# Patient Record
Sex: FEMALE | Race: WHITE | URBAN - METROPOLITAN AREA
[De-identification: names, ages, dates, MRNs, and addresses within clinical notes are randomized per-mention and may not be internally consistent; named-entity substitution may affect disease eponyms.]

---

## 2022-09-02 PROBLEM — 428283002 HISTORY OF POLYP OF COLON: Status: ACTIVE | Noted: 2022-09-02

## 2022-09-08 ENCOUNTER — OFFICE VISIT (OUTPATIENT)
Dept: URBAN - METROPOLITAN AREA CLINIC 12 | Facility: CLINIC | Age: 47
End: 2022-09-08
Payer: COMMERCIAL

## 2022-09-08 ENCOUNTER — WEB ENCOUNTER (OUTPATIENT)
Dept: URBAN - METROPOLITAN AREA CLINIC 12 | Facility: CLINIC | Age: 47
End: 2022-09-08

## 2022-09-08 VITALS
DIASTOLIC BLOOD PRESSURE: 83 MMHG | WEIGHT: 164.6 LBS | BODY MASS INDEX: 26.45 KG/M2 | HEART RATE: 73 BPM | TEMPERATURE: 97.5 F | HEIGHT: 66 IN | SYSTOLIC BLOOD PRESSURE: 118 MMHG

## 2022-09-08 DIAGNOSIS — R79.89 ABNORMAL LFTS: ICD-10-CM

## 2022-09-08 DIAGNOSIS — R19.7 DIARRHEA: ICD-10-CM

## 2022-09-08 PROCEDURE — 99204 OFFICE O/P NEW MOD 45 MIN: CPT | Performed by: INTERNAL MEDICINE

## 2022-09-08 RX ORDER — AZILSARTAN KAMEDOXOMIL 40 MG/1
1 TABLET TABLET ORAL
Status: ACTIVE | COMMUNITY

## 2022-09-08 NOTE — HPI-TODAY'S VISIT:
46-year-old female presented today for evaluation of diarrhea.  Symptoms started 4 weeks ago she started having 5-6 bowel movement a day mostly in the morning associated with the bloating.  No blood in the stool but she had mild abdominal cramping associated with the diarrhea.  No nausea no vomiting.  She was seen by her primary care 3 days ago she had a stool studies and lab both were still pending at the time of this evaluation.  Patient is scheduled for screening colonoscopy with Dr. Cancino in September 21, 2022.  She no recent antibiotic use or travel she had previous lab about 2 weeks ago showed mildly elevated AST 48 ALT 60 had repeated lab 3 days ago still pending She never had colonoscopy  before no family history of colon cancer.  No recent travel or antibiotic use no NSAID use

## 2022-09-10 ENCOUNTER — TELEPHONE ENCOUNTER (OUTPATIENT)
Dept: URBAN - METROPOLITAN AREA CLINIC 23 | Facility: CLINIC | Age: 47
End: 2022-09-10

## 2022-09-12 ENCOUNTER — TELEPHONE ENCOUNTER (OUTPATIENT)
Dept: URBAN - METROPOLITAN AREA CLINIC 23 | Facility: CLINIC | Age: 47
End: 2022-09-12

## 2022-09-21 ENCOUNTER — CLAIMS CREATED FROM THE CLAIM WINDOW (OUTPATIENT)
Dept: URBAN - METROPOLITAN AREA CLINIC 4 | Facility: CLINIC | Age: 47
End: 2022-09-21
Payer: COMMERCIAL

## 2022-09-21 ENCOUNTER — OFFICE VISIT (OUTPATIENT)
Dept: URBAN - METROPOLITAN AREA SURGERY CENTER 15 | Facility: SURGERY CENTER | Age: 47
End: 2022-09-21

## 2022-09-21 ENCOUNTER — TELEPHONE ENCOUNTER (OUTPATIENT)
Dept: URBAN - METROPOLITAN AREA CLINIC 78 | Facility: CLINIC | Age: 47
End: 2022-09-21

## 2022-09-21 ENCOUNTER — CLAIMS CREATED FROM THE CLAIM WINDOW (OUTPATIENT)
Dept: URBAN - METROPOLITAN AREA SURGERY CENTER 15 | Facility: SURGERY CENTER | Age: 47
End: 2022-09-21
Payer: COMMERCIAL

## 2022-09-21 DIAGNOSIS — R19.7 ACUTE DIARRHEA: ICD-10-CM

## 2022-09-21 DIAGNOSIS — K51.80 CHRONIC PANCOLONIC ULCERATIVE COLITIS: ICD-10-CM

## 2022-09-21 DIAGNOSIS — K51.919 ULCERATIVE COLITIS, UNSPECIFIED WITH UNSPECIFIED COMPLICATIONS: ICD-10-CM

## 2022-09-21 PROCEDURE — 88305 TISSUE EXAM BY PATHOLOGIST: CPT | Performed by: PATHOLOGY

## 2022-09-21 PROCEDURE — G8907 PT DOC NO EVENTS ON DISCHARG: HCPCS | Performed by: INTERNAL MEDICINE

## 2022-09-21 PROCEDURE — 45380 COLONOSCOPY AND BIOPSY: CPT | Performed by: INTERNAL MEDICINE

## 2022-09-21 PROCEDURE — 88342 IMHCHEM/IMCYTCHM 1ST ANTB: CPT | Performed by: PATHOLOGY

## 2022-09-21 PROCEDURE — 88341 IMHCHEM/IMCYTCHM EA ADD ANTB: CPT | Performed by: PATHOLOGY

## 2022-09-21 RX ORDER — MESALAMINE 4 G/60ML
AS DIRECTED SUSPENSION RECTAL QHS
Qty: 21 | Refills: 1 | OUTPATIENT
Start: 2022-09-21 | End: 2022-11-02

## 2022-09-21 RX ORDER — MESALAMINE 1.2 G/1
3 TABLETS TABLET, DELAYED RELEASE ORAL ONCE A DAY
Qty: 270 TABLET | Refills: 2 | OUTPATIENT
Start: 2022-09-21 | End: 2023-06-18

## 2022-09-21 RX ORDER — AZILSARTAN KAMEDOXOMIL 40 MG/1
1 TABLET TABLET ORAL
Status: ACTIVE | COMMUNITY

## 2022-09-27 ENCOUNTER — OFFICE VISIT (OUTPATIENT)
Dept: URBAN - METROPOLITAN AREA CLINIC 78 | Facility: CLINIC | Age: 47
End: 2022-09-27
Payer: COMMERCIAL

## 2022-09-27 ENCOUNTER — TELEPHONE ENCOUNTER (OUTPATIENT)
Dept: URBAN - METROPOLITAN AREA CLINIC 78 | Facility: CLINIC | Age: 47
End: 2022-09-27

## 2022-09-27 VITALS — RESPIRATION RATE: 16 BRPM | WEIGHT: 158 LBS | HEIGHT: 66 IN | BODY MASS INDEX: 25.39 KG/M2 | TEMPERATURE: 98.1 F

## 2022-09-27 DIAGNOSIS — R19.7 DIARRHEA, UNSPECIFIED TYPE: ICD-10-CM

## 2022-09-27 DIAGNOSIS — K62.5 RECTAL BLEEDING: ICD-10-CM

## 2022-09-27 DIAGNOSIS — R79.89 ELEVATED FERRITIN: ICD-10-CM

## 2022-09-27 DIAGNOSIS — R74.8 ELEVATED LIVER ENZYMES: ICD-10-CM

## 2022-09-27 PROCEDURE — 99214 OFFICE O/P EST MOD 30 MIN: CPT | Performed by: INTERNAL MEDICINE

## 2022-09-27 RX ORDER — AZILSARTAN KAMEDOXOMIL 40 MG/1
1 TABLET TABLET ORAL
Status: ACTIVE | COMMUNITY

## 2022-09-27 RX ORDER — MESALAMINE 4 G/60ML
AS DIRECTED SUSPENSION RECTAL QHS
Qty: 21 | Refills: 1 | Status: ACTIVE | COMMUNITY
Start: 2022-09-21 | End: 2022-11-02

## 2022-09-27 RX ORDER — MESALAMINE 1.2 G/1
3 TABLETS TABLET, DELAYED RELEASE ORAL ONCE A DAY
Qty: 270 TABLET | Refills: 2 | Status: ACTIVE | COMMUNITY
Start: 2022-09-21 | End: 2023-06-18

## 2022-10-15 LAB
A/G RATIO: 1.6
ACTIN (SMOOTH MUSCLE) ANTIBODY: 2
ALBUMIN: 4.1
ALKALINE PHOSPHATASE: 64
ALT (SGPT): 16
AST (SGOT): 15
ATYPICAL PANCA: (no result)
BILIRUBIN, TOTAL: 0.4
BUN/CREATININE RATIO: 21
BUN: 18
CALCIUM: 9.2
CARBON DIOXIDE, TOTAL: 22
CHLORIDE: 99
CREATININE: 0.87
CYTOPLASMIC (C-ANCA): (no result)
EGFR: 82
FERRITIN, SERUM: 522
GLOBULIN, TOTAL: 2.6
GLUCOSE: 64
HEMATOCRIT: 36.4
HEMOGLOBIN: 11.8
IRON BIND.CAP.(TIBC): 292
IRON SATURATION: 13
IRON: 38
MCH: 29.1
MCHC: 32.4
MCV: 90
MITOCHONDRIAL (M2) ANTIBODY: <20
NRBC: (no result)
PERINUCLEAR (P-ANCA): (no result)
PLATELETS: 281
POTASSIUM: 4.2
PROTEIN, TOTAL: 6.7
RBC: 4.05
RDW: 13.1
SODIUM: 137
UIBC: 254
VITAMIN B12: 1019
WBC: 9.5

## 2022-10-25 ENCOUNTER — OFFICE VISIT (OUTPATIENT)
Dept: URBAN - METROPOLITAN AREA CLINIC 78 | Facility: CLINIC | Age: 47
End: 2022-10-25
Payer: COMMERCIAL

## 2022-10-25 VITALS
WEIGHT: 170 LBS | HEIGHT: 66 IN | HEART RATE: 66 BPM | SYSTOLIC BLOOD PRESSURE: 138 MMHG | TEMPERATURE: 98 F | DIASTOLIC BLOOD PRESSURE: 82 MMHG | BODY MASS INDEX: 27.32 KG/M2

## 2022-10-25 DIAGNOSIS — K62.5 RECTAL BLEEDING: ICD-10-CM

## 2022-10-25 DIAGNOSIS — R74.8 ELEVATED LIVER ENZYMES: ICD-10-CM

## 2022-10-25 DIAGNOSIS — R19.7 DIARRHEA, UNSPECIFIED TYPE: ICD-10-CM

## 2022-10-25 DIAGNOSIS — R79.89 ELEVATED FERRITIN: ICD-10-CM

## 2022-10-25 PROCEDURE — 99214 OFFICE O/P EST MOD 30 MIN: CPT | Performed by: INTERNAL MEDICINE

## 2022-10-25 RX ORDER — MESALAMINE 4 G/60ML
AS DIRECTED SUSPENSION RECTAL QHS
Qty: 21 | Refills: 1 | Status: ACTIVE | COMMUNITY
Start: 2022-09-21 | End: 2022-11-02

## 2022-10-25 RX ORDER — AZILSARTAN KAMEDOXOMIL 40 MG/1
1 TABLET TABLET ORAL
Status: ACTIVE | COMMUNITY

## 2022-10-25 RX ORDER — MESALAMINE 1.2 G/1
3 TABLETS TABLET, DELAYED RELEASE ORAL ONCE A DAY
Qty: 270 TABLET | Refills: 2 | Status: ACTIVE | COMMUNITY
Start: 2022-09-21 | End: 2023-06-18

## 2022-10-25 NOTE — HPI-TODAY'S VISIT:
The patient was referred to us by Dr. Holli Dyer for bloating, diarrhea and rectal bleeding. A copy of this note will be sent to the referring physician.   Mrs. Carey had been scheduled for a routine colonoscopy, however presented to Dr. Coleman's office in the interim for evaluation of diarrhea.    Symptoms started 4 weeks prir. She described having 5-6 bowel movements a day mostly in the morning associated with bloating. + blood in the stool & mild abdominal cramping.  No nausea no vomiting.   Labs showed mildly elevated AST of 48 & ALT of 60.  Today we reviewed her colonoscopy results and path report. We alos reviewed her recent labs.   She finished the enemas on Oct 11th. She has since been feeling great. She has not had any further rectal bleeding. She is having 1 soft, formed BM daily. No tenesmus. No abdominal pain.  No family history of colon cancer.   Her ferritin levels were high, whereas the percent saturation level was low. She follows with Dr. Vanessa Vuong. She does admit to fatigue.  She works for the [employer].   Summary of prior workup: - Labs 9/27/22: Iron 38, percent saturation 13, B12 1019, ferritin 522.  ASMA and AMA both negative.  ANCA negative.  Glucose 64, BUN 18, creatinine 0.8, sodium 137, potassium 4.2, calcium 9.2, total protein 6.7, albumin 4.1, total bilirubin 0.4, alkaline phosphatase 64, AST 15, ALT 16.  WBC 9.5, hemoglobin 11.8, MCV 90, platelets 281. - Col by me on 9/21/22: Normal terminal ileum, mild to moderate colitis suggestive of UC throughout the entire colon. Biopsies from both the R and L colon confirmed diffuse/chronic active colitis throughout. Small nonbleeding grade 1 internal hemorrhoids.  Quality of the prep was good. - RUQ US in 7/29/22: Unremarkable  - Labs on 7/29/22: ALT 69.

## 2022-11-16 NOTE — PHYSICAL EXAM GASTROINTESTINAL
Abdomen , soft, nontender, nondistended , no guarding or rigidity , no masses palpable , normal bowel sounds , Liver and Spleen,  no hepatosplenomegaly , liver nontender
No

## 2022-12-29 ENCOUNTER — OFFICE VISIT (OUTPATIENT)
Dept: URBAN - METROPOLITAN AREA CLINIC 78 | Facility: CLINIC | Age: 47
End: 2022-12-29

## 2023-01-17 ENCOUNTER — OFFICE VISIT (OUTPATIENT)
Dept: URBAN - METROPOLITAN AREA CLINIC 78 | Facility: CLINIC | Age: 48
End: 2023-01-17
Payer: COMMERCIAL

## 2023-01-17 VITALS
HEART RATE: 73 BPM | TEMPERATURE: 98.2 F | SYSTOLIC BLOOD PRESSURE: 122 MMHG | WEIGHT: 185 LBS | DIASTOLIC BLOOD PRESSURE: 83 MMHG | RESPIRATION RATE: 16 BRPM | BODY MASS INDEX: 29.73 KG/M2 | HEIGHT: 66 IN

## 2023-01-17 DIAGNOSIS — K51.90 ULCERATIVE COLITIS: ICD-10-CM

## 2023-01-17 DIAGNOSIS — R74.8 ELEVATED LIVER ENZYMES: ICD-10-CM

## 2023-01-17 DIAGNOSIS — R19.7 DIARRHEA, UNSPECIFIED TYPE: ICD-10-CM

## 2023-01-17 DIAGNOSIS — R79.89 ELEVATED FERRITIN: ICD-10-CM

## 2023-01-17 DIAGNOSIS — K62.5 RECTAL BLEEDING: ICD-10-CM

## 2023-01-17 PROBLEM — 64766004 ULCERATIVE COLITIS: Status: ACTIVE | Noted: 2023-01-17

## 2023-01-17 PROCEDURE — 99214 OFFICE O/P EST MOD 30 MIN: CPT | Performed by: INTERNAL MEDICINE

## 2023-01-17 RX ORDER — AZILSARTAN KAMEDOXOMIL 40 MG/1
1 TABLET TABLET ORAL
Status: ACTIVE | COMMUNITY

## 2023-01-17 RX ORDER — MESALAMINE 1.2 G/1
3 TABLETS TABLET, DELAYED RELEASE ORAL ONCE A DAY
Qty: 270 TABLET | Refills: 2
Start: 2022-09-21 | End: 2023-10-14

## 2023-01-17 RX ORDER — MESALAMINE 1.2 G/1
3 TABLETS TABLET, DELAYED RELEASE ORAL ONCE A DAY
Qty: 270 TABLET | Refills: 2 | Status: ACTIVE | COMMUNITY
Start: 2022-09-21 | End: 2023-06-18

## 2023-01-17 NOTE — HPI-TODAY'S VISIT:
The patient was referred to us by Dr. Holli Dyer for bloating, diarrhea and rectal bleeding. A copy of this note will be sent to the referring physician.   Mrs. Carey had been scheduled for a routine colonoscopy, however presented to Dr. Coleman's office in the interim for evaluation of diarrhea.    Symptoms started 4 weeks prior. She described having 5-6 bowel movements a day mostly in the morning associated with bloating. + blood in the stool & mild abdominal cramping.  No nausea no vomiting.   Labs showed mildly elevated AST of 48 & ALT of 60. She had labs repeated end of Nov 2022 and everything was nromal.  She finished the enemas on Oct 11th. She has since been feeling great. She has not had any further rectal bleeding. She is having 3-4 soft, formed BM daily- which she blames on the fact that she has not been eating as helathy as she should lately. No tenesmus. No abdominal pain.  No family history of colon cancer.   Her ferritin levels were high, but have since normalized. She follows with Dr. Vanessa Vuong and last saw her in Nov. She does admit to fatigue.  Denies any rash or symptoms suggestive of uveitis. She does experience knee pain but only when exercising.   She works for the [employer].   Summary of prior workup: - Labs 9/27/22: Iron 38, percent saturation 13, B12 1019, ferritin 522.  ASMA and AMA both negative.  ANCA negative.  Glucose 64, BUN 18, creatinine 0.8, sodium 137, potassium 4.2, calcium 9.2, total protein 6.7, albumin 4.1, total bilirubin 0.4, alkaline phosphatase 64, AST 15, ALT 16.  WBC 9.5, hemoglobin 11.8, MCV 90, platelets 281. - Col by me on 9/21/22: Normal terminal ileum, mild to moderate colitis suggestive of UC throughout the entire colon. Biopsies from both the R and L colon confirmed diffuse/chronic active colitis throughout. Small nonbleeding grade 1 internal hemorrhoids.  Quality of the prep was good. - RUQ US in 7/29/22: Unremarkable  - Labs on 7/29/22: ALT 69.

## 2023-06-12 ENCOUNTER — OFFICE VISIT (OUTPATIENT)
Dept: URBAN - METROPOLITAN AREA CLINIC 78 | Facility: CLINIC | Age: 48
End: 2023-06-12
Payer: COMMERCIAL

## 2023-06-12 ENCOUNTER — DASHBOARD ENCOUNTERS (OUTPATIENT)
Age: 48
End: 2023-06-12

## 2023-06-12 VITALS
SYSTOLIC BLOOD PRESSURE: 137 MMHG | HEART RATE: 69 BPM | HEIGHT: 66 IN | RESPIRATION RATE: 17 BRPM | WEIGHT: 184 LBS | DIASTOLIC BLOOD PRESSURE: 88 MMHG | BODY MASS INDEX: 29.57 KG/M2 | TEMPERATURE: 98.2 F

## 2023-06-12 DIAGNOSIS — K62.5 RECTAL BLEEDING: ICD-10-CM

## 2023-06-12 DIAGNOSIS — R53.83 FATIGUE, UNSPECIFIED TYPE: ICD-10-CM

## 2023-06-12 DIAGNOSIS — R74.8 ELEVATED LIVER ENZYMES: ICD-10-CM

## 2023-06-12 DIAGNOSIS — R79.89 ELEVATED FERRITIN: ICD-10-CM

## 2023-06-12 DIAGNOSIS — K51.90 ULCERATIVE COLITIS: ICD-10-CM

## 2023-06-12 DIAGNOSIS — R19.7 DIARRHEA, UNSPECIFIED TYPE: ICD-10-CM

## 2023-06-12 PROCEDURE — 99214 OFFICE O/P EST MOD 30 MIN: CPT | Performed by: INTERNAL MEDICINE

## 2023-06-12 RX ORDER — MESALAMINE 1.2 G/1
3 TABLETS TABLET, DELAYED RELEASE ORAL ONCE A DAY
Qty: 270 TABLET | Refills: 2 | Status: ACTIVE | COMMUNITY
Start: 2022-09-21 | End: 2023-10-14

## 2023-06-12 RX ORDER — AZILSARTAN KAMEDOXOMIL 40 MG/1
1 TABLET TABLET ORAL
Status: ACTIVE | COMMUNITY

## 2023-06-12 NOTE — HPI-TODAY'S VISIT:
The patient was referred to us by Dr. Holli Dyer for follow up of UC. A copy of this note will be sent to the referring physician.   Patient initially presented with 5-6 bowel movements a day mostly in the morning associated with bloating. + blood in the stool & mild abdominal cramping.     Labs showed mildly elevated AST of 48 & ALT of 60. She had labs repeated end of Nov 2022 and everything was nromal.  She was treated with Mesalamine enemas and PO Mesalamine.   She has since been feeling great. She has not had any further rectal bleeding. She is having 2-3 soft, formed BM daily. No tenesmus. No abdominal pain. No heartburn or dysphagia. She has had rare, short-lived episodes of nausea. Appetite has been great. Weight has been stable.   No family history of colon cancer.   Her ferritin levels were high, but have since normalized. She follows with Dr. Vanessa Vuong and last saw her recently. She does admit to fatigue. She feels she is getting a good sleep. No CP, PAZ or lightheadedness.   Denies any rash or symptoms suggestive of uveitis. She does experience knee pain but only when exercising.   She works for the [email or web address].   Summary of prior workup: - Labs 9/27/22: Iron 38, percent saturation 13, B12 1019, ferritin 522.  ASMA and AMA both negative.  ANCA negative.  Glucose 64, BUN 18, creatinine 0.8, sodium 137, potassium 4.2, calcium 9.2, total protein 6.7, albumin 4.1, total bilirubin 0.4, alkaline phosphatase 64, AST 15, ALT 16.  WBC 9.5, hemoglobin 11.8, MCV 90, platelets 281. - Col by me on 9/21/22: Normal terminal ileum, mild to moderate colitis suggestive of UC throughout the entire colon. Biopsies from both the R and L colon confirmed diffuse/chronic active colitis throughout. Small nonbleeding grade 1 internal hemorrhoids.  Quality of the prep was good. - RUQ US in 7/29/22: Unremarkable  - Labs on 7/29/22: ALT 69.

## 2023-06-22 LAB
A/G RATIO: 2
ALBUMIN: 4.9
ALKALINE PHOSPHATASE: 87
ALT (SGPT): 21
AST (SGOT): 22
BILIRUBIN, TOTAL: 0.8
BUN/CREATININE RATIO: 27
BUN: 21
CALCIUM: 9.7
CARBON DIOXIDE, TOTAL: 23
CHLORIDE: 99
CREATININE: 0.79
EGFR: 92
GLOBULIN, TOTAL: 2.4
GLUCOSE: 74
POTASSIUM: 4.2
PROTEIN, TOTAL: 7.3
SODIUM: 140
TSH: 1.55
VITAMIN B12: 1470
VITAMIN D, 25-HYDROXY: 40.4

## 2023-08-25 ENCOUNTER — CLAIMS CREATED FROM THE CLAIM WINDOW (OUTPATIENT)
Dept: URBAN - METROPOLITAN AREA SURGERY CENTER 15 | Facility: SURGERY CENTER | Age: 48
End: 2023-08-25
Payer: COMMERCIAL

## 2023-08-25 ENCOUNTER — CLAIMS CREATED FROM THE CLAIM WINDOW (OUTPATIENT)
Dept: URBAN - METROPOLITAN AREA SURGERY CENTER 15 | Facility: SURGERY CENTER | Age: 48
End: 2023-08-25

## 2023-08-25 ENCOUNTER — CLAIMS CREATED FROM THE CLAIM WINDOW (OUTPATIENT)
Dept: URBAN - METROPOLITAN AREA CLINIC 4 | Facility: CLINIC | Age: 48
End: 2023-08-25
Payer: COMMERCIAL

## 2023-08-25 DIAGNOSIS — K63.89 APPENDICITIS EPIPLOICA: ICD-10-CM

## 2023-08-25 DIAGNOSIS — K63.89 OTHER SPECIFIED DISEASES OF INTESTINE: ICD-10-CM

## 2023-08-25 DIAGNOSIS — K51.00 ACUTE ULCERATIVE PANCOLITIS: ICD-10-CM

## 2023-08-25 PROCEDURE — 45380 COLONOSCOPY AND BIOPSY: CPT | Performed by: INTERNAL MEDICINE

## 2023-08-25 PROCEDURE — G8907 PT DOC NO EVENTS ON DISCHARG: HCPCS | Performed by: INTERNAL MEDICINE

## 2023-08-25 PROCEDURE — 88305 TISSUE EXAM BY PATHOLOGIST: CPT | Performed by: PATHOLOGY

## 2023-08-25 PROCEDURE — 45385 COLONOSCOPY W/LESION REMOVAL: CPT | Performed by: INTERNAL MEDICINE

## 2023-08-25 RX ORDER — AZILSARTAN KAMEDOXOMIL 40 MG/1
1 TABLET TABLET ORAL
Status: ACTIVE | COMMUNITY

## 2023-08-25 RX ORDER — MESALAMINE 1.2 G/1
3 TABLETS TABLET, DELAYED RELEASE ORAL ONCE A DAY
Qty: 270 TABLET | Refills: 2 | Status: ACTIVE | COMMUNITY
Start: 2022-09-21 | End: 2023-10-14

## 2025-05-01 ENCOUNTER — WEB ENCOUNTER (OUTPATIENT)
Dept: URBAN - METROPOLITAN AREA CLINIC 78 | Facility: CLINIC | Age: 50
End: 2025-05-01

## 2025-05-01 RX ORDER — MESALAMINE 1.2 G/1
3 TABLETS TABLET, DELAYED RELEASE ORAL ONCE A DAY
Qty: 270 TABLET | Refills: 3 | OUTPATIENT
Start: 2022-09-21